# Patient Record
Sex: FEMALE | Race: WHITE
[De-identification: names, ages, dates, MRNs, and addresses within clinical notes are randomized per-mention and may not be internally consistent; named-entity substitution may affect disease eponyms.]

---

## 2018-08-08 ENCOUNTER — HOSPITAL ENCOUNTER (EMERGENCY)
Dept: HOSPITAL 75 - ER | Age: 68
Discharge: ANOTHER HOSPITAL | End: 2018-08-08
Payer: MEDICARE

## 2018-08-08 VITALS — HEIGHT: 58 IN | BODY MASS INDEX: 37.99 KG/M2 | WEIGHT: 181 LBS

## 2018-08-08 VITALS — SYSTOLIC BLOOD PRESSURE: 163 MMHG | DIASTOLIC BLOOD PRESSURE: 93 MMHG

## 2018-08-08 DIAGNOSIS — Z88.1: ICD-10-CM

## 2018-08-08 DIAGNOSIS — F41.9: ICD-10-CM

## 2018-08-08 DIAGNOSIS — I10: ICD-10-CM

## 2018-08-08 DIAGNOSIS — Z98.890: ICD-10-CM

## 2018-08-08 DIAGNOSIS — Z88.0: ICD-10-CM

## 2018-08-08 DIAGNOSIS — Z95.5: ICD-10-CM

## 2018-08-08 DIAGNOSIS — Z98.51: ICD-10-CM

## 2018-08-08 DIAGNOSIS — E78.00: ICD-10-CM

## 2018-08-08 DIAGNOSIS — Z88.2: ICD-10-CM

## 2018-08-08 DIAGNOSIS — T80.1XXA: Primary | ICD-10-CM

## 2018-08-08 DIAGNOSIS — Z79.02: ICD-10-CM

## 2018-08-08 DIAGNOSIS — Z88.5: ICD-10-CM

## 2018-08-08 DIAGNOSIS — Z90.49: ICD-10-CM

## 2018-08-08 DIAGNOSIS — R07.2: ICD-10-CM

## 2018-08-08 DIAGNOSIS — Z79.82: ICD-10-CM

## 2018-08-08 LAB
ALBUMIN SERPL-MCNC: 3.3 GM/DL (ref 3.2–4.5)
ALP SERPL-CCNC: 157 U/L (ref 40–136)
ALT SERPL-CCNC: 60 U/L (ref 0–55)
APTT BLD: 28 SEC (ref 24–35)
BASOPHILS # BLD AUTO: 0 10^3/UL (ref 0–0.1)
BASOPHILS NFR BLD AUTO: 0 % (ref 0–10)
BILIRUB SERPL-MCNC: 0.3 MG/DL (ref 0.1–1)
BUN/CREAT SERPL: 17
CALCIUM SERPL-MCNC: 10.1 MG/DL (ref 8.5–10.1)
CHLORIDE SERPL-SCNC: 109 MMOL/L (ref 98–107)
CO2 SERPL-SCNC: 23 MMOL/L (ref 21–32)
CREAT SERPL-MCNC: 0.63 MG/DL (ref 0.6–1.3)
EOSINOPHIL # BLD AUTO: 0.2 10^3/UL (ref 0–0.3)
EOSINOPHIL NFR BLD AUTO: 3 % (ref 0–10)
ERYTHROCYTE [DISTWIDTH] IN BLOOD BY AUTOMATED COUNT: 13.9 % (ref 10–14.5)
GFR SERPLBLD BASED ON 1.73 SQ M-ARVRAT: > 60 ML/MIN
GLUCOSE SERPL-MCNC: 97 MG/DL (ref 70–105)
HCT VFR BLD CALC: 28 % (ref 35–52)
HGB BLD-MCNC: 9.2 G/DL (ref 11.5–16)
INR PPP: 1.1 (ref 0.8–1.4)
LIPASE SERPL-CCNC: 4 U/L (ref 8–78)
LYMPHOCYTES # BLD AUTO: 1.8 X 10^3 (ref 1–4)
LYMPHOCYTES NFR BLD AUTO: 26 % (ref 12–44)
MAGNESIUM SERPL-MCNC: 2 MG/DL (ref 1.8–2.4)
MANUAL DIFFERENTIAL PERFORMED BLD QL: NO
MCH RBC QN AUTO: 30 PG (ref 25–34)
MCHC RBC AUTO-ENTMCNC: 34 G/DL (ref 32–36)
MCV RBC AUTO: 90 FL (ref 80–99)
MONOCYTES # BLD AUTO: 0.7 X 10^3 (ref 0–1)
MONOCYTES NFR BLD AUTO: 10 % (ref 0–12)
MYOGLOBIN SERPL-MCNC: 46.1 NG/ML (ref 10–92)
NEUTROPHILS # BLD AUTO: 4.3 X 10^3 (ref 1.8–7.8)
NEUTROPHILS NFR BLD AUTO: 62 % (ref 42–75)
PLATELET # BLD: 349 10^3/UL (ref 130–400)
PMV BLD AUTO: 10.2 FL (ref 7.4–10.4)
POTASSIUM SERPL-SCNC: 4.1 MMOL/L (ref 3.6–5)
PROT SERPL-MCNC: 6.3 GM/DL (ref 6.4–8.2)
PROTHROMBIN TIME: 13.8 SEC (ref 12.2–14.7)
RBC # BLD AUTO: 3.06 10^6/UL (ref 4.35–5.85)
SODIUM SERPL-SCNC: 139 MMOL/L (ref 135–145)
WBC # BLD AUTO: 7.1 10^3/UL (ref 4.3–11)

## 2018-08-08 PROCEDURE — 83874 ASSAY OF MYOGLOBIN: CPT

## 2018-08-08 PROCEDURE — 71275 CT ANGIOGRAPHY CHEST: CPT

## 2018-08-08 PROCEDURE — 36415 COLL VENOUS BLD VENIPUNCTURE: CPT

## 2018-08-08 PROCEDURE — 71045 X-RAY EXAM CHEST 1 VIEW: CPT

## 2018-08-08 PROCEDURE — 93005 ELECTROCARDIOGRAM TRACING: CPT

## 2018-08-08 PROCEDURE — 84484 ASSAY OF TROPONIN QUANT: CPT

## 2018-08-08 PROCEDURE — 74018 RADEX ABDOMEN 1 VIEW: CPT

## 2018-08-08 PROCEDURE — 83735 ASSAY OF MAGNESIUM: CPT

## 2018-08-08 PROCEDURE — 83690 ASSAY OF LIPASE: CPT

## 2018-08-08 PROCEDURE — 85730 THROMBOPLASTIN TIME PARTIAL: CPT

## 2018-08-08 PROCEDURE — 96374 THER/PROPH/DIAG INJ IV PUSH: CPT

## 2018-08-08 PROCEDURE — 85610 PROTHROMBIN TIME: CPT

## 2018-08-08 PROCEDURE — 80053 COMPREHEN METABOLIC PANEL: CPT

## 2018-08-08 PROCEDURE — 85379 FIBRIN DEGRADATION QUANT: CPT

## 2018-08-08 PROCEDURE — 85025 COMPLETE CBC W/AUTO DIFF WBC: CPT

## 2018-08-08 PROCEDURE — 93041 RHYTHM ECG TRACING: CPT

## 2018-08-08 NOTE — DIAGNOSTIC IMAGING REPORT
EXAMINATION: Supine abdomen at 06:32 p.m.



INDICATION: Back pain.



FINDINGS: A single supine view of the abdomen and pelvis was

obtained. There are no prior studies available for comparison.



There is a midline row of skin staples and there are bilateral

pedicle screws in place at L4 and L5.



There is degenerative disc and bony disease throughout the lumbar

spine, but there is no evidence for an acute bony abnormality.

There does seem to be a long-standing compression deformity of

T11 and T12.



There is a 1 cm calcification overlying the inferior pole of the

right kidney. This may be intrarenal. If further study is

desired, then CT will be recommended.



There is some gas in both the large and small bowel in a

nonspecific fashion. There is no evidence for bowel obstruction.

There is no mass or organomegaly appreciated.



IMPRESSION:

1. The bowel gas pattern is nonspecific. There is no acute

abnormality evident.

2. There are postsurgical changes consistent with a fusion of L4

and L5.

3. There is a question of nephrolithiasis on the right.

Recommendations as above.



Dictated by: 



  Dictated on workstation # [id number]

## 2018-08-08 NOTE — DIAGNOSTIC IMAGING REPORT
EXAMINATION: Portable erect AP chest at 06:27 p.m.



INDICATION: Chest pain.



FINDINGS: There are no prior studies available for comparison.



The heart is mildly enlarged. The central pulmonary vascularity

is somewhat prominent, but there is no evidence for overt failure

or pneumonia. There is no pleural effusion identified either. The

mediastinum is not widened. The osseous structures are intact.

There are bilateral total shoulder prostheses in place.



IMPRESSION: There is mild cardiomegaly, but there is no evidence

for an acute cardiopulmonary abnormality.



Dictated by: 



  Dictated on workstation # [id number]

## 2018-08-08 NOTE — DIAGNOSTIC IMAGING REPORT
PROCEDURE: CT angiography of the chest with contrast.



TECHNIQUE: Multiple contiguous axial images were obtained through

the chest after uneventful bolus administration of intravenous

contrast. Reconstructed CTA MIP acquisitions were also performed.

 

INDICATION: Chest pain radiating to the back.



COMPARISON: Chest x-ray from the same day.



FINDINGS: The aorta demonstrates mild atherosclerosis without

dissection or aneurysm seen. The aortic arch branch vessels are

unremarkable. The pulmonary arteries are diagnostic to the

segmental level. No filling defects are seen to suggest pulmonary

embolus. The heart is upper normal in size. There is no

pericardial effusion. No mediastinal adenopathy is seen.

Postsurgical changes are seen in the shoulders, bilaterally.



No focal consolidation is seen. There is minimal atelectasis at

the dependent lungs, bilaterally. No pleural effusion or

pneumothorax is seen. No central endobronchial lesions are seen.

There are multilevel degenerative changes in the spine. No acute

fracture is seen on this large field-of-view. The gas seen in a

few vertebral bodies are likely due to Schmorl's nodes. The

imaged portions of the upper abdomen demonstrate no acute

abnormality.



IMPRESSION:

1. No aortic aneurysm or dissection. There is mild

atherosclerosis.

2. No pulmonary embolus.

3. Minimal dependent atelectasis.

4. Degenerative changes in the thoracic spine.



Dictated by: 



  Dictated on workstation # [id number]

## 2025-04-03 NOTE — ED CHEST PAIN
Dr. Cabrera note conveyed. Pt verbalized understanding without further questions.    General


Stated Complaint:  CHEST PAIN


Source:  patient, EMS, nursing home records


Exam Limitations:  no limitations





History of Present Illness


Date Seen by Provider:  Aug 8, 2018


Time Seen by Provider:  18:11


Initial Comments


Patient presents to the ER by private conveyance from [facility] where 

she was doing rehabilitation 8 days after a lumbar laminectomy. Patient states 

that just prior to calling EMS she started having some substernal chest pain 

centered around her just on him and radiating towards her back. It also 

radiates up her chest and along the midline. She says it is reminiscent of the 

pain she had prior to receiving her 2 stents in the past. She takes medicines 

for blood pressure hypercholesterolemia and anxiety disorder. She is on Plavix 

but no other blood thinners she takes estradiol. She is Lasix 40 mg once a day. 

She's had a hard time with opiates in the past because they make her have 

terrible delirium. She does tolerate tramadol. She is a retired nurse. She's 

feeling no shortness of breath or cough but she is having nausea without chills 

fever or vomiting. EMS gave her 324 mg of baby aspirin to chew up on route and 

1 dose of nitroglycerin which took her pain from a 5 to nothing. This initially 

arrived in the ER her pain began to resume at about 4-5 out of 10 in the same 

place. She's been up walking around independently with her walker since about 

day 1 she says and has not been moving in bed. She denies any history of AAA. 

She sees a cardiologist Dr. MCKEON at [facility] in [place], [place] and 

her primary care doctor is there as well. She states she also feels bloated 

like she's constipated. She says she is passing gas and having bowel movements.


The pt has a Surg Hx of Tubal Lig, Hyst, Appendectomy, Cholecystectomy.





Allergies and Home Medications


Allergies


Coded Allergies:  


     Penicillins (Verified  Allergy, Unknown, 18)


     Sulfa (Sulfonamide Antibiotics) (Verified  Allergy, Unknown, 18)


     doxycycline (Verified  Allergy, Unknown, 18)


     hydrocodone (Verified  Allergy, Unknown, 18)


     oxycodone (Verified  Allergy, Unknown, 18)


     prochlorperazine (Verified  Allergy, Unknown, 18)


     vancomycin (Verified  Allergy, Unknown, 18)





Home Medications


Acetaminophen 325 Mg Tablet, 650 MG PO Q4H, (Reported)


Acetaminophen 500 Mg Tablet, 500 MG PO Q6H PRN for TEMPERATURE, (Reported)


Alprazolam 0.5 Mg Tablet, 0.5 MG PO Q12H, (Reported)


Aspirin 81 Mg Tablet.dr, 81 MG PO DAILY, (Reported)


Atorvastatin Calcium 40 Mg Tablet, 40 MG PO DAILY, (Reported)


Carvedilol 12.5 Mg Tablet, 12.5 MG PO BID, (Reported)


Clopidogrel Bisulfate 75 Mg Tablet, 75 MG PO DAILY, (Reported)


Docusate Sodium 100 Mg Tablet, 100 MG PO BID, (Reported)


Duloxetine HCl 60 Mg Capsule.dr, 60 MG PO DAILY, (Reported)


Estradiol 1 Mg Tablet, 1 MG PO DAILY, (Reported)


Furosemide 40 Mg Tablet, 40 MG PO Q48H, (Reported)


Lisinopril 10 Mg Tablet, 10 MG PO DAILY, (Reported)


Meclizine HCl 25 Mg Tablet, 25 MG PO DAILY, (Reported)


Omega-3S/Dha/Epa/Fish Oil 1 Each Capsule.dr, 1 EACH PO BID PRN, (Reported)


Tramadol HCl 50 Mg Tablet, 50 MG PO Q8H, (Reported)


Trazodone HCl 50 Mg Tablet, 50 MG PO DAILY, (Reported)





Patient Home Medication List


Home Medication List Reviewed:  Yes





Review of Systems


Constitutional:  No chills, No diaphoresis


EENTM:  No Blurred Vision, No Double Vision


Respiratory:  Denies Cough, Denies Orthopnea, Denies Shortness of Air


Cardiovascular:  See HPI, Chest Pain; Denies Edema, Denies Irregular Heart Rate

, Denies Lightheadedness, Denies Palpitations, Denies Syncope


Gastrointestinal:  Denies Abdomen Distended, Denies Abdominal Pain; Constipated

; Denies Diarrhea, Denies Difficulty Swallowing; Nausea; Denies Vomiting


Genitourinary:  Denies Burning, Denies Discharge


Musculoskeletal:  No back pain, No joint pain


Skin:  No pruritus, No rash





Past Medical-Social-Family Hx


Patient Social History


Alcohol Use:  Denies Use


Recreational Drug Use:  No


Smoking Status:  Never a Smoker





Physical Exam


Vital Signs





Vital Signs - First Documented








 18





 18:22


 


Temp 100.1


 


Pulse 73


 


Resp 16


 


B/P (MAP) 148/69 (95)


 


Pulse Ox 98





Capillary Refill :


Height, Weight, BMI


Height: '"


Weight: lbs. oz. kg;  BMI


Method:


General Appearance:  WD/WN, Mild Distress


HEENT:  PERRL/EOMI, Normal ENT Inspection, Pharynx Normal, Moist Mucous 

Membranes


Neck:  Full Range of Motion, Non Tender, Supple


Respiratory:  Chest Non Tender, Lungs Clear, Normal Breath Sounds, No Accessory 

Muscle Use, No Respiratory Distress


Cardiovascular:  Regular Rate, Rhythm, No Edema, Normal Peripheral Pulses


Gastrointestinal:  Normal Bowel Sounds, Non Tender, Soft


Extremity:  Normal Capillary Refill, Normal Inspection, No Pedal Edema


Neurologic/Psychiatric:  Alert, Oriented x3, No Motor/Sensory Deficits, Normal 

Mood/Affect


Skin:  Normal Color, Warm/Dry





Progress/Results/Core Measures


Results/Orders


Lab Results





Laboratory Tests








Test


 18


18:08 18


19:52 Range/Units


 


 


White Blood Count


 7.1 


 


 4.3-11.0


10^3/uL


 


Red Blood Count


 3.06 L


 


 4.35-5.85


10^6/uL


 


Hemoglobin 9.2 L  11.5-16.0  G/DL


 


Hematocrit 28 L  35-52  %


 


Mean Corpuscular Volume 90   80-99  FL


 


Mean Corpuscular Hemoglobin 30   25-34  PG


 


Mean Corpuscular Hemoglobin


Concent 34 


 


 32-36  G/DL





 


Red Cell Distribution Width 13.9   10.0-14.5  %


 


Platelet Count


 349 


 


 130-400


10^3/uL


 


Mean Platelet Volume 10.2   7.4-10.4  FL


 


Neutrophils (%) (Auto) 62   42-75  %


 


Lymphocytes (%) (Auto) 26   12-44  %


 


Monocytes (%) (Auto) 10   0-12  %


 


Eosinophils (%) (Auto) 3   0-10  %


 


Basophils (%) (Auto) 0   0-10  %


 


Neutrophils # (Auto) 4.3   1.8-7.8  X 10^3


 


Lymphocytes # (Auto) 1.8   1.0-4.0  X 10^3


 


Monocytes # (Auto) 0.7   0.0-1.0  X 10^3


 


Eosinophils # (Auto)


 0.2 


 


 0.0-0.3


10^3/uL


 


Basophils # (Auto)


 0.0 


 


 0.0-0.1


10^3/uL


 


Sodium Level 139   135-145  MMOL/L


 


Potassium Level 4.1   3.6-5.0  MMOL/L


 


Chloride Level 109 H    MMOL/L


 


Carbon Dioxide Level 23   21-32  MMOL/L


 


Anion Gap 7   5-14  MMOL/L


 


Blood Urea Nitrogen 11   7-18  MG/DL


 


Creatinine


 0.63 


 


 0.60-1.30


MG/DL


 


Estimat Glomerular Filtration


Rate > 60 


 


  





 


BUN/Creatinine Ratio 17    


 


Glucose Level 97     MG/DL


 


Calcium Level 10.1   8.5-10.1  MG/DL


 


Corrected Calcium 10.7 H  8.5-10.1  MG/DL


 


Magnesium Level 2.0   1.8-2.4  MG/DL


 


Total Bilirubin 0.3   0.1-1.0  MG/DL


 


Aspartate Amino Transf


(AST/SGOT) 27 


 


 5-34  U/L





 


Alanine Aminotransferase


(ALT/SGPT) 60 H


 


 0-55  U/L





 


Alkaline Phosphatase 157 H    U/L


 


Myoglobin


 46.1 


 


 10.0-92.0


NG/ML


 


Troponin I < 0.30   <0.30  NG/ML


 


Total Protein 6.3 L  6.4-8.2  GM/DL


 


Albumin 3.3   3.2-4.5  GM/DL


 


Lipase 4 L  8-78  U/L


 


Prothrombin Time  13.8  12.2-14.7  SEC


 


INR Comment  1.1  0.8-1.4  


 


Activated Partial


Thromboplast Time 


 28 


 24-35  SEC





 


D-Dimer


 


 2.17 H


 0.00-0.49


UG/ML








My Orders





Orders - PATY LIZ


Cbc With Automated Diff (18 18:16)


Magnesium (18 18:16)


Chest 1 View, Ap/Pa Only (18 18:16)


Ekg Tracing (18 18:16)


Cardiac Profile 1 (18 18:16)


Comprehensive Metabolic Panel (18 18:16)


Myoglobin Serum (18 18:16)


Protime With Inr (18 18:16)


Partial Thromboplastin Time (18 18:16)


O2 (18 18:16)


Monitor-Rhythm Ecg Trace Only (18 18:16)


Nitroglycerin 0.4 Mg Btl 25's (Nitrostat (18 18:30)


Saline Lock/Iv-Start (18 18:16)


Lipase (18 18:16)


Fibrin Degradation Products (18 18:16)


Ondansetron Injection (Zofran Injectio (18 18:30)


Abdomen/Kub 1view (18 18:27)


Ct Angio Chest W (18 20:39)


Saline Lock/Iv-Start (18 20:39)


Ns Iv 1000 Ml (Sodium Chloride 0.9%) (18 20:39)


Iohexol Injection (Omnipaque 350 Mg/Ml 1 (18 21:00)


Ns (Ivpb) (Sodium Chloride 0.9%) (18 21:00)





Medications Given in ED





Current Medications








 Medications  Dose


 Ordered  Sig/Cindy


 Route  Start Time


 Stop Time Status Last Admin


Dose Admin


 


 Iohexol  125 ml  ONCE  ONCE


 IV  18 21:00


 18 21:01 DC 18 21:41


125 ML


 


 Nitroglycerin  0.4 mg  UD  PRN


 SL  18 18:30


 18 23:53 DC 18 18:40


0.4 MG


 


 Ondansetron HCl  4 mg  ONCE  ONCE


 IVP  18 18:30


 18 18:31 DC 18 18:49


4 MG


 


 Sodium Chloride  250 ml  ONCE  ONCE


 IV  18 21:00


 18 21:01 DC 18 21:41


80 ML








Vital Signs/I&O











 18





 18:22


 


Temp 100.1


 


Pulse 73


 


Resp 16


 


B/P (MAP) 148/69 (95)


 


Pulse Ox 98











Progress


Progress Note #1:  


   Time:  18:25


Progress Note


The patient's on Plavix not a blood thinner but she's been up moving around she 

says the past week. She is not having any shortness breath or coughed so DVT is 

not clinically present and a PE is very unlikely. We will compare her d-dimer 

to an age adjusted d-dimer score. 8 days out from surgery however it is 

Possible she is still going to have an elevated d-dimer. No clinical history of 

AAA however her pain is radiating to her back. Most concerning would be a 

cardiogenic source of pain and the EKG initially is unremarkable we'll see what 

her labs show and consult cardiology as appropriate. Since her pain is come 

back to going to give her some nitroglycerin.


Progress Note #2:  


   Time:  19:39


Progress Note


After 1 dose of nitroglycerin in the ER her pain went back down to 0. With 1 

dose of Zofran IV her nausea is gone.


Progress Note #3:  


   Time:  20:40


Progress Note


Patient's pain is still gone. Nausea is gone. We discussed the pros and cons of 

doing a CT angiogram of her chest and she is agreed to do it. We are going to 

give her a liter bolus of fluids and put her on some fluids overnight. She has 

expressed her desire to be heart catheter by her cardiologist if possible and 

after we get the CT angiogram of her chest done we will discuss transferring 

her to her home cardiologist 20 minutes down the road since she is adamant 

against having a heart catheter done by local cardiology. It would probably be 

of more benefit to the patient to have her heart catheter done by the 

cardiologist who knows her.


Progress Note #4:  


   Time:  22:14


Progress Note


While undergoing her CT angiogram she received a large bolus of the IV contrast 

in her left upper arm secondary to IV infiltration. We have used warm blankets 

and now on ice pack along with Coban wrap.


Initial ECG Impression Date:  Aug 8, 2018


Initial ECG Impression Time:  18:11


Initial ECG Rate:  75


Initial ECG Rhythm:  Normal Sinus


Initial ECG Intervals:  Normal


Initial ECG Impression:  Normal


Initial ECG Comparisson:  No Previous ECG Available


Comment


No ST elevation or depression.





Diagnostic Imaging





   Diagonstic Imaging:  Xray


   Plain Films/CT/US/NM/MRI:  chest


Comments


 [facility].


 [place], [place]





NAME:   DANIEL FINE


[facility] REC#:   [id number]


ACCOUNT#:   [id number]


PT STATUS:   REG ER


:   1950


PHYSICIAN:   PATY LIZ MD


ADMIT DATE:   18/ER


 ***Draft***


Date of Exam:18





CHEST 1 VIEW, AP/PA ONLY








EXAMINATION: Portable erect AP chest at 06:27 p.m.





INDICATION: Chest pain.





FINDINGS: There are no prior studies available for comparison.





The heart is mildly enlarged. The central pulmonary vascularity


is somewhat prominent, but there is no evidence for overt failure


or pneumonia. There is no pleural effusion identified either. The


mediastinum is not widened. The osseous structures are intact.


There are bilateral total shoulder prostheses in place.





IMPRESSION: There is mild cardiomegaly, but there is no evidence


for an acute cardiopulmonary abnormality.





  Dictated on workstation # [id number]








Dict:   18


Trans:   18


 [id number]





Interpreted by:     GLENIS THEODORE MD


Electronically signed by:


   Reviewed:  Reviewed by Me








   Diagonstic Imaging:  Xray


   Plain Films/CT/US/NM/MRI:  abdomen (1v kub)


Comments


 [facility].


 [place], [place]





NAME:   DANIEL FINE


[facility] REC#:   [id number]


ACCOUNT#:   [id number]


PT STATUS:   REG ER


:   1950


PHYSICIAN:   PATY LIZ MD


ADMIT DATE:   18/ER


 ***Draft***


Date of Exam:18





ABDOMEN/KUB 1VIEW








EXAMINATION: Supine abdomen at 06:32 p.m.





INDICATION: Back pain.





FINDINGS: A single supine view of the abdomen and pelvis was


obtained. There are no prior studies available for comparison.





There is a midline row of skin staples and there are bilateral


pedicle screws in place at L4 and L5.





There is degenerative disc and bony disease throughout the lumbar


spine, but there is no evidence for an acute bony abnormality.


There does seem to be a long-standing compression deformity of


T11 and T12.





There is a 1 cm calcification overlying the inferior pole of the


right kidney. This may be intrarenal. If further study is


desired, then CT will be recommended.





There is some gas in both the large and small bowel in a


nonspecific fashion. There is no evidence for bowel obstruction.


There is no mass or organomegaly appreciated.





IMPRESSION:


1. The bowel gas pattern is nonspecific. There is no acute


abnormality evident.


2. There are postsurgical changes consistent with a fusion of L4


and L5.


3. There is a question of nephrolithiasis on the right.


Recommendations as above.





  Dictated on workstation # [id number]








Dict:   18


Trans:   18


 [id number]





Interpreted by:     GLENIS THEODORE MD


Electronically signed by:


   Reviewed:  Reviewed by Me








   Diagonstic Imaging:  CT (angio)


   Plain Films/CT/US/NM/MRI:  chest


Comments


 [facility].


 [place], [place]





NAME:   DANIEL FINE


[facility] REC#:   [id number]


ACCOUNT#:   [id number]


PT STATUS:   REG ER


:   1950


PHYSICIAN:   PATY LIZ MD


ADMIT DATE:   18/ER


 ***Draft***


Date of Exam:18





CT ANGIO CHEST W








PROCEDURE: CT angiography of the chest with contrast.





TECHNIQUE: Multiple contiguous axial images were obtained through


the chest after uneventful bolus administration of intravenous


contrast. Reconstructed CTA MIP acquisitions were also performed.


 


INDICATION: Chest pain radiating to the back.





COMPARISON: Chest x-ray from the same day.





FINDINGS: The aorta demonstrates mild atherosclerosis without


dissection or aneurysm seen. The aortic arch branch vessels are


unremarkable. The pulmonary arteries are diagnostic to the


segmental level. No filling defects are seen to suggest pulmonary


embolus. The heart is upper normal in size. There is no


pericardial effusion. No mediastinal adenopathy is seen.


Postsurgical changes are seen in the shoulders, bilaterally.





No focal consolidation is seen. There is minimal atelectasis at


the dependent lungs, bilaterally. No pleural effusion or


pneumothorax is seen. No central endobronchial lesions are seen.


There are multilevel degenerative changes in the spine. No acute


fracture is seen on this large field-of-view. A few vertebral


bodies are likely due to Schmorl's nodes. The imaged portions of


the upper abdomen demonstrate no acute abnormality.





IMPRESSION:


1. No aortic aneurysm or dissection. There is mild


atherosclerosis.


2. No pulmonary embolus.


3. Minimal dependent atelectasis.


4. Degenerative changes in the thoracic spine.





  Dictated on workstation # [id number]








Dict:   18


Trans:   18


[facility] [phone]





Interpreted by:     COCO CARVAJAL MD


Electronically signed by:


   Reviewed:  Reviewed by Me


Consults :  


   Consulting Physician:  DOLORES VO MD


Consults Notes


He feels with the patient's risk factor she should be admitted and have a heart 

catheter done in the morning.





Departure


Impression





 Primary Impression:  


 Chest pain


 Qualified Codes:  R07.9 - Chest pain, unspecified


 Additional Impression:  


 IV infiltration


 Qualified Codes:  T80.1XXA - Vascular complications following infusion, 

transfusion and therapeutic injection, initial encounter


Disposition:  02 XFER SHT-TRM HOSP


Condition:  Stable





Transfer


Time Spoke to Accepting Phy:  22:10


Transfer Progress Notes


[place] Triage: [place]. 


Dr Eligio Alvarez Cards on for Dr BARRON


Transfer Time:  23:55


Transfer Facility:  


[facility] [place], [place].


Method of Transfer:  EMS





Departure-Patient Inst.


Referrals:  


UNKNOWN (PCP)


Primary Care Physician





Copy


Copies To 1:   DOLORES VO MD, TITUS J Aug 8, 2018 18:24